# Patient Record
Sex: FEMALE | Race: WHITE
[De-identification: names, ages, dates, MRNs, and addresses within clinical notes are randomized per-mention and may not be internally consistent; named-entity substitution may affect disease eponyms.]

---

## 2018-05-09 ENCOUNTER — HOSPITAL ENCOUNTER (INPATIENT)
Dept: HOSPITAL 92 - SURG A | Age: 28
LOS: 9 days | Discharge: HOME | DRG: 621 | End: 2018-05-18
Attending: SURGERY | Admitting: SURGERY
Payer: COMMERCIAL

## 2018-05-09 ENCOUNTER — HOSPITAL ENCOUNTER (OUTPATIENT)
Dept: HOSPITAL 92 - LABBT | Age: 28
Discharge: HOME | End: 2018-05-09
Attending: SURGERY
Payer: COMMERCIAL

## 2018-05-09 VITALS — BODY MASS INDEX: 50.4 KG/M2

## 2018-05-09 DIAGNOSIS — E66.01: Primary | ICD-10-CM

## 2018-05-09 DIAGNOSIS — E66.01: ICD-10-CM

## 2018-05-09 DIAGNOSIS — Z01.818: Primary | ICD-10-CM

## 2018-05-09 LAB
ALBUMIN SERPL BCG-MCNC: 3.9 G/DL (ref 3.5–5)
ALP SERPL-CCNC: 58 U/L (ref 40–150)
ALT SERPL W P-5'-P-CCNC: 57 U/L (ref 8–55)
ANION GAP SERPL CALC-SCNC: 11 MMOL/L (ref 10–20)
AST SERPL-CCNC: 30 U/L (ref 5–34)
BASOPHILS # BLD AUTO: 0.1 THOU/UL (ref 0–0.2)
BASOPHILS NFR BLD AUTO: 0.8 % (ref 0–1)
BILIRUB DIRECT SERPL-MCNC: 0.2 MG/DL (ref 0.1–0.3)
BILIRUB SERPL-MCNC: 0.5 MG/DL (ref 0.2–1.2)
BUN SERPL-MCNC: 11 MG/DL (ref 7–18.7)
CALCIUM SERPL-MCNC: 9.4 MG/DL (ref 7.8–10.44)
CHLORIDE SERPL-SCNC: 105 MMOL/L (ref 98–107)
CO2 SERPL-SCNC: 24 MMOL/L (ref 22–29)
CREAT CL PREDICTED SERPL C-G-VRATE: 0 ML/MIN (ref 70–130)
EOSINOPHIL # BLD AUTO: 0.1 THOU/UL (ref 0–0.7)
EOSINOPHIL NFR BLD AUTO: 2.1 % (ref 0–10)
GLOBULIN SER CALC-MCNC: 3.8 G/DL (ref 2.4–3.5)
GLUCOSE SERPL-MCNC: 86 MG/DL (ref 70–105)
HGB BLD-MCNC: 15.1 G/DL (ref 12–16)
LYMPHOCYTES # BLD: 2.2 THOU/UL (ref 1.2–3.4)
LYMPHOCYTES NFR BLD AUTO: 29.9 % (ref 21–51)
MCH RBC QN AUTO: 29.2 PG (ref 27–31)
MCV RBC AUTO: 87 FL (ref 81–99)
MONOCYTES # BLD AUTO: 0.3 THOU/UL (ref 0.11–0.59)
MONOCYTES NFR BLD AUTO: 4.3 % (ref 0–10)
NEUTROPHILS # BLD AUTO: 4.5 THOU/UL (ref 1.4–6.5)
NEUTROPHILS NFR BLD AUTO: 63 % (ref 42–75)
PLATELET # BLD AUTO: 276 THOU/UL (ref 130–400)
POTASSIUM SERPL-SCNC: 4.3 MMOL/L (ref 3.5–5.1)
PREGS CONTROL BACKGROUND?: (no result)
PREGS CONTROL BAR APPEAR?: YES
RBC # BLD AUTO: 5.17 MILL/UL (ref 4.2–5.4)
SODIUM SERPL-SCNC: 136 MMOL/L (ref 136–145)
WBC # BLD AUTO: 7.2 THOU/UL (ref 4.8–10.8)

## 2018-05-09 PROCEDURE — 80048 BASIC METABOLIC PNL TOTAL CA: CPT

## 2018-05-09 PROCEDURE — 93010 ELECTROCARDIOGRAM REPORT: CPT

## 2018-05-09 PROCEDURE — 80076 HEPATIC FUNCTION PANEL: CPT

## 2018-05-09 PROCEDURE — 84703 CHORIONIC GONADOTROPIN ASSAY: CPT

## 2018-05-09 PROCEDURE — C9113 INJ PANTOPRAZOLE SODIUM, VIA: HCPCS

## 2018-05-09 PROCEDURE — 93005 ELECTROCARDIOGRAM TRACING: CPT

## 2018-05-09 PROCEDURE — 85025 COMPLETE CBC W/AUTO DIFF WBC: CPT

## 2018-05-09 PROCEDURE — 88312 SPECIAL STAINS GROUP 1: CPT

## 2018-05-09 PROCEDURE — 88307 TISSUE EXAM BY PATHOLOGIST: CPT

## 2018-05-09 PROCEDURE — 80053 COMPREHEN METABOLIC PANEL: CPT

## 2018-05-09 PROCEDURE — 74241: CPT

## 2018-05-09 PROCEDURE — 71046 X-RAY EXAM CHEST 2 VIEWS: CPT

## 2018-05-09 PROCEDURE — 83036 HEMOGLOBIN GLYCOSYLATED A1C: CPT

## 2018-05-09 PROCEDURE — 94760 N-INVAS EAR/PLS OXIMETRY 1: CPT

## 2018-05-09 PROCEDURE — 36415 COLL VENOUS BLD VENIPUNCTURE: CPT

## 2018-05-09 NOTE — RAD
PA AND LATERAL CHEST:

 

Date:  05/09/18 

 

HISTORY:  

Preoperative evaluation. 

 

FINDINGS:

Cardiac silhouette and pulmonary vasculature are within normal limits. Lungs are clear. Osseous struc
tures are intact. 

 

IMPRESSION: 

No acute cardiopulmonary process. 

 

 

POS: [facility]

## 2018-05-17 PROCEDURE — 0DJ08ZZ INSPECTION OF UPPER INTESTINAL TRACT, VIA NATURAL OR ARTIFICIAL OPENING ENDOSCOPIC: ICD-10-PCS | Performed by: SURGERY

## 2018-05-17 PROCEDURE — 0DB64Z3 EXCISION OF STOMACH, PERCUTANEOUS ENDOSCOPIC APPROACH, VERTICAL: ICD-10-PCS | Performed by: SURGERY

## 2018-05-17 RX ADMIN — POTASSIUM CHLORIDE, DEXTROSE MONOHYDRATE AND SODIUM CHLORIDE SCH: 150; 5; 450 INJECTION, SOLUTION INTRAVENOUS at 18:47

## 2018-05-17 RX ADMIN — POTASSIUM CHLORIDE, DEXTROSE MONOHYDRATE AND SODIUM CHLORIDE SCH MLS: 150; 5; 450 INJECTION, SOLUTION INTRAVENOUS at 13:25

## 2018-05-17 RX ADMIN — Medication SCH GM: at 17:34

## 2018-05-17 RX ADMIN — POTASSIUM CHLORIDE, DEXTROSE MONOHYDRATE AND SODIUM CHLORIDE SCH MLS: 150; 5; 450 INJECTION, SOLUTION INTRAVENOUS at 20:04

## 2018-05-17 NOTE — OP
DATE OF PROCEDURE:  05/17/2018

 

PREOPERATIVE DIAGNOSIS:  Morbid obesity.

 

SURGEON:  Tai Meanrd M.D.

 

PROCEDURE PERFORMED:  Laparoscopic sleeve gastrectomy and esophagogastroscopy.

 

INDICATIONS:  This is a 28-year-old female, morbidly obese, who has attempted multiple weight loss pr
ograms without success.

 

FINDINGS:  A 38 [place] bougie used.

 

PROCEDURE IN DETAIL:  After informed consent was obtained, the patient was taken to the operating chuck
m and given general endotracheal anesthesia.  She was placed in supine position.  Her abdomen was pre
pped and draped in usual fashion.  Local anesthesia infiltrated subcutaneously and deep.  A 12 mm inc
ision was performed approximately 8 inches below the xiphoid slightly to the left.  Veress needle ins
erted.  Drop test performed.  Pneumoperitoneum was created to a volume of 2 liters of carbon dioxide.
  Utilizing a bladeless 12 mm trocar and 0 degree laparoscope direct visual entry in the abdominal ca
vity was performed.  Pneumoperitoneum was created to a pressure of 15 mmHg.  The patient placed in st
eep reverse Trendelenburg position.  Nathansen liver retractor inserted.  Left lobe of liver retracte
d superiorly.  The pylorus identified and a 12 mm port placed on the right beneath it and two 12s rayray
shari left subcostal.  The omentum was taken off the greater curvature 5 cm from the pylorus utilizing 
the LigaSure.  Short gastrics divided with LigaSure, left crura defined with the LigaSure.  A 38-Fren
ch bougie inserted and directed into the antrum.  The linear 60 mm green load stapler used to divide 
the antrum to the bougie, gold load along the bougie, and a series of blues through the angle of His.
  Intraoperative endoscopy was performed.  The video endoscope inserted under direct vision and advan
shari into the sleeve.  Staple line inspected.  There was no bleeding.  Staple line then tested by infl
ating the new stomach with pressurized air under water.  There was no air leak.  Stomach decompressed
.  Scope removed.  The remnant stomach removed from the abdomen through the left lateral port site.  
The fascia closed with 0 Vicryl suture and the GraNee needle.  Trocars and retractors removed.  The s
kin closed with interrupted 4-0 Rapide.  Dermabond applied.  The patient tolerated the procedure well
 and was transferred to recovery in good condition.  Sponge and needle count verified correct x2.

## 2018-05-18 VITALS — SYSTOLIC BLOOD PRESSURE: 144 MMHG | TEMPERATURE: 97.7 F | DIASTOLIC BLOOD PRESSURE: 80 MMHG

## 2018-05-18 LAB
ANION GAP SERPL CALC-SCNC: 9 MMOL/L (ref 10–20)
BASOPHILS # BLD AUTO: 0 THOU/UL (ref 0–0.2)
BASOPHILS NFR BLD AUTO: 0 % (ref 0–1)
BUN SERPL-MCNC: 6 MG/DL (ref 7–18.7)
CALCIUM SERPL-MCNC: 8.5 MG/DL (ref 7.8–10.44)
CHLORIDE SERPL-SCNC: 105 MMOL/L (ref 98–107)
CO2 SERPL-SCNC: 25 MMOL/L (ref 22–29)
CREAT CL PREDICTED SERPL C-G-VRATE: 267 ML/MIN (ref 70–130)
EOSINOPHIL # BLD AUTO: 0.1 THOU/UL (ref 0–0.7)
EOSINOPHIL NFR BLD AUTO: 0.5 % (ref 0–10)
GLUCOSE SERPL-MCNC: 115 MG/DL (ref 70–105)
HGB BLD-MCNC: 13.4 G/DL (ref 12–16)
LYMPHOCYTES # BLD: 1.2 THOU/UL (ref 1.2–3.4)
LYMPHOCYTES NFR BLD AUTO: 9.8 % (ref 21–51)
MCH RBC QN AUTO: 29 PG (ref 27–31)
MCV RBC AUTO: 88 FL (ref 81–99)
MONOCYTES # BLD AUTO: 0.4 THOU/UL (ref 0.11–0.59)
MONOCYTES NFR BLD AUTO: 3.6 % (ref 0–10)
NEUTROPHILS # BLD AUTO: 10.5 THOU/UL (ref 1.4–6.5)
NEUTROPHILS NFR BLD AUTO: 86.2 % (ref 42–75)
PLATELET # BLD AUTO: 260 THOU/UL (ref 130–400)
POTASSIUM SERPL-SCNC: 4.2 MMOL/L (ref 3.5–5.1)
RBC # BLD AUTO: 4.62 MILL/UL (ref 4.2–5.4)
SODIUM SERPL-SCNC: 135 MMOL/L (ref 136–145)
WBC # BLD AUTO: 12.2 THOU/UL (ref 4.8–10.8)

## 2018-05-18 RX ADMIN — Medication SCH GM: at 00:50

## 2018-05-18 NOTE — DIS
DATE OF ADMISSION:  05/17/2018

 

DATE OF DISCHARGE:  05/18/2018

 

DISCHARGE DIAGNOSIS:  Morbid obesity.

 

PROCEDURES DURING ADMISSION:  Laparoscopic sleeve gastrectomy, intraoperative esophagogastroscopy, po
stoperative Gastrografin swallow.

 

HOSPITAL COURSE:  The patient was admitted, taken to the operating room where she underwent a sleeve 
gastrectomy.  Postoperatively, she has done well.  Swallow was fine.  She is tolerating liquids well.
  She is discharged home on hydrocodone and Zofran.  She will follow up with me in 2 weeks.

## 2018-05-18 NOTE — RAD
LIMITED UPPER GI WITH 13 ML ORAL GASTROGRAFIN:

 

HISTORY: 

Status post recent vertical sleeve gastrectomy, bariatric surgery.

 

FINDINGS: 

There is prompt passage of contrast from the esophagus into the stomach.  No contrast extravasation i
s seen.

 

IMPRESSION: 

No evidence of obstruction or leak.

 

POS: [facility]